# Patient Record
Sex: MALE | Race: WHITE
[De-identification: names, ages, dates, MRNs, and addresses within clinical notes are randomized per-mention and may not be internally consistent; named-entity substitution may affect disease eponyms.]

---

## 2018-03-07 ENCOUNTER — HOSPITAL ENCOUNTER (OUTPATIENT)
Dept: HOSPITAL 47 - LABWHC1 | Age: 53
Discharge: HOME | End: 2018-03-07
Payer: COMMERCIAL

## 2018-03-07 DIAGNOSIS — J44.9: Primary | ICD-10-CM

## 2018-03-07 PROCEDURE — 85008 BL SMEAR W/O DIFF WBC COUNT: CPT

## 2018-03-07 PROCEDURE — 36415 COLL VENOUS BLD VENIPUNCTURE: CPT

## 2018-05-12 ENCOUNTER — HOSPITAL ENCOUNTER (OUTPATIENT)
Dept: HOSPITAL 47 - LABWHC1 | Age: 53
Discharge: HOME | End: 2018-05-12
Payer: COMMERCIAL

## 2018-05-12 DIAGNOSIS — R05: ICD-10-CM

## 2018-05-12 DIAGNOSIS — J45.909: Primary | ICD-10-CM

## 2018-05-12 LAB
A ALTERNATA IGE QN: <0.1 KU/L
CAT DANDER IGE QN: <0.1 KU/L
COMMON RAGWEED IGE QN: <0.1 KU/L
D FARINAE IGE QN: <0.1 KU/L
RED OAK IGE QN: <0.1 KU/L
RED TOP GRASS IGE QN: <0.1 KU/L

## 2018-05-12 PROCEDURE — 86003 ALLG SPEC IGE CRUDE XTRC EA: CPT

## 2018-05-12 PROCEDURE — 82785 ASSAY OF IGE: CPT

## 2018-05-12 PROCEDURE — 36415 COLL VENOUS BLD VENIPUNCTURE: CPT

## 2021-04-06 ENCOUNTER — HOSPITAL ENCOUNTER (OUTPATIENT)
Dept: HOSPITAL 47 - LABPAT | Age: 56
Discharge: HOME | End: 2021-04-06
Attending: INTERNAL MEDICINE
Payer: MEDICARE

## 2021-04-06 DIAGNOSIS — R07.9: ICD-10-CM

## 2021-04-06 DIAGNOSIS — Z01.818: Primary | ICD-10-CM

## 2021-04-06 LAB
ANION GAP SERPL CALC-SCNC: 7 MMOL/L
BUN SERPL-SCNC: 17 MG/DL (ref 9–20)
CHLORIDE SERPL-SCNC: 103 MMOL/L (ref 98–107)
CO2 SERPL-SCNC: 29 MMOL/L (ref 22–30)
ERYTHROCYTE [DISTWIDTH] IN BLOOD BY AUTOMATED COUNT: 4.75 M/UL (ref 4.3–5.9)
ERYTHROCYTE [DISTWIDTH] IN BLOOD: 12.8 % (ref 11.5–15.5)
HCT VFR BLD AUTO: 43.4 % (ref 39–53)
HGB BLD-MCNC: 15 GM/DL (ref 13–17.5)
MCH RBC QN AUTO: 31.6 PG (ref 25–35)
MCHC RBC AUTO-ENTMCNC: 34.6 G/DL (ref 31–37)
MCV RBC AUTO: 91.4 FL (ref 80–100)
PLATELET # BLD AUTO: 381 K/UL (ref 150–450)
POTASSIUM SERPL-SCNC: 4.9 MMOL/L (ref 3.5–5.1)
SODIUM SERPL-SCNC: 139 MMOL/L (ref 137–145)
WBC # BLD AUTO: 10.5 K/UL (ref 3.8–10.6)

## 2021-04-06 PROCEDURE — 36415 COLL VENOUS BLD VENIPUNCTURE: CPT

## 2021-04-06 PROCEDURE — 85027 COMPLETE CBC AUTOMATED: CPT

## 2021-04-06 PROCEDURE — 84520 ASSAY OF UREA NITROGEN: CPT

## 2021-04-06 PROCEDURE — 82565 ASSAY OF CREATININE: CPT

## 2021-04-06 PROCEDURE — 80051 ELECTROLYTE PANEL: CPT

## 2021-04-14 ENCOUNTER — HOSPITAL ENCOUNTER (OUTPATIENT)
Dept: HOSPITAL 47 - CATHCVL | Age: 56
End: 2021-04-14
Attending: INTERNAL MEDICINE
Payer: MEDICARE

## 2021-04-14 VITALS — HEART RATE: 46 BPM | DIASTOLIC BLOOD PRESSURE: 72 MMHG | SYSTOLIC BLOOD PRESSURE: 123 MMHG

## 2021-04-14 VITALS — TEMPERATURE: 97.8 F | RESPIRATION RATE: 18 BRPM

## 2021-04-14 VITALS — BODY MASS INDEX: 23.1 KG/M2

## 2021-04-14 DIAGNOSIS — I20.0: Primary | ICD-10-CM

## 2021-04-14 DIAGNOSIS — Z87.891: ICD-10-CM

## 2021-04-14 DIAGNOSIS — Z79.52: ICD-10-CM

## 2021-04-14 DIAGNOSIS — Z79.899: ICD-10-CM

## 2021-04-14 DIAGNOSIS — E78.00: ICD-10-CM

## 2021-04-14 DIAGNOSIS — E78.5: ICD-10-CM

## 2021-04-14 DIAGNOSIS — Z88.8: ICD-10-CM

## 2021-04-14 DIAGNOSIS — I10: ICD-10-CM

## 2021-04-14 PROCEDURE — 93458 L HRT ARTERY/VENTRICLE ANGIO: CPT

## 2021-04-14 NOTE — CC
CARDIAC CATHETERIZATION REPORT



DATE OF SERVICE:

April 14, 2021



PERFORMING PHYSICIAN:

Kahlil Metcalf MD.



PROCEDURE PERFORMED:

1. Selective right and left coronary angiogram.

2. Left heart catheterization.



INDICATION:

This is a very pleasant 55-year-old gentleman with history of smoking and hypertension

and dyslipidemia was experiencing symptoms of chest discomfort concerning for angina.



APPROACH:

Right radial artery.



COMPLICATION:

None.



LEVEL OF SEDATION:

Moderate with sedation length of 20 minutes.



PROCEDURE DESCRIPTION:

After obtaining an informed consent, the patient was brought to the cardiac cath lab.

The right radial artery was cannulated using micropuncture technique and a

micropuncture wire passed easily then I placed a 6-Gabones[place] sheath in the right radial

artery.



I did selective right and left coronary angiogram using JR4 and JL3.5 catheters.



Left heart catheterization was performed using the JR4 catheter which crossed the

aortic valve then I did pullback across the valve.



Please note that the patient was given 2 mg of verapamil IA and 6000 units of heparin

IV at the beginning of the procedure.



SELECTIVE CORONARY ANGIOGRAM:

1. The right coronary artery is a large caliber vessel. It is a dominant vessel.  It

    is angiographically normal. It bifurcates distally into PDA and PLV branches, both

    appeared to be angiographically normal.

2. The left main is angiographically normal. It bifurcates into LCX and LAD.

3. The LCX is a large caliber vessel. It is a nondominant vessel.  It appeared to be

    angiographically normal as well.

4. The LAD is a large caliber vessel.  It is angiographically normal. It gives rise

    into the first, second and third diagonal branches and all appeared to be

    angiographically normal.



HEMODYNAMICS:

The LVEDP was about 8 mmHg without significant gradient across the aortic valve.



CONCLUSION:

1. Normal coronary angiogram.

2. Normal LVEDP.



POSTPROCEDURE MANAGEMENT:

1. Medical treatment.

2. Follow up with the patient.





MMODL / IJN: [id number] / Job#: [id number]

## 2025-04-28 ENCOUNTER — HOSPITAL ENCOUNTER (OUTPATIENT)
Dept: HOSPITAL 47 - RADCTMAIN | Age: 60
Discharge: HOME | End: 2025-04-28
Attending: FAMILY MEDICINE
Payer: MEDICARE

## 2025-04-28 DIAGNOSIS — J43.9: ICD-10-CM

## 2025-04-28 DIAGNOSIS — J44.9: ICD-10-CM

## 2025-04-28 DIAGNOSIS — F17.210: ICD-10-CM

## 2025-04-28 DIAGNOSIS — Z12.2: Primary | ICD-10-CM

## 2025-04-28 DIAGNOSIS — I27.21: ICD-10-CM

## 2025-04-28 PROCEDURE — 71271 CT THORAX LUNG CANCER SCR C-: CPT
